# Patient Record
Sex: MALE | Race: WHITE | HISPANIC OR LATINO | Employment: FULL TIME | URBAN - METROPOLITAN AREA
[De-identification: names, ages, dates, MRNs, and addresses within clinical notes are randomized per-mention and may not be internally consistent; named-entity substitution may affect disease eponyms.]

---

## 2023-02-21 ENCOUNTER — HOSPITAL ENCOUNTER (EMERGENCY)
Facility: CLINIC | Age: 38
Discharge: HOME | End: 2023-02-21
Attending: EMERGENCY MEDICINE | Admitting: EMERGENCY MEDICINE

## 2023-02-21 VITALS
OXYGEN SATURATION: 98 % | DIASTOLIC BLOOD PRESSURE: 76 MMHG | HEART RATE: 62 BPM | TEMPERATURE: 98.5 F | RESPIRATION RATE: 20 BRPM | SYSTOLIC BLOOD PRESSURE: 117 MMHG

## 2023-02-21 DIAGNOSIS — S05.92XA BILATERAL EYE INJURIES, INITIAL ENCOUNTER: ICD-10-CM

## 2023-02-21 DIAGNOSIS — S05.91XA BILATERAL EYE INJURIES, INITIAL ENCOUNTER: ICD-10-CM

## 2023-02-21 DIAGNOSIS — S05.00XA CORNEAL ABRASION, UNSPECIFIED LATERALITY, INITIAL ENCOUNTER: ICD-10-CM

## 2023-02-21 PROCEDURE — 250N000009 HC RX 250: Performed by: EMERGENCY MEDICINE

## 2023-02-21 PROCEDURE — 99284 EMERGENCY DEPT VISIT MOD MDM: CPT

## 2023-02-21 RX ORDER — TETRACAINE HYDROCHLORIDE 5 MG/ML
2 SOLUTION OPHTHALMIC ONCE
Status: COMPLETED | OUTPATIENT
Start: 2023-02-21 | End: 2023-02-21

## 2023-02-21 RX ORDER — CYCLOPENTOLATE HYDROCHLORIDE 10 MG/ML
1 SOLUTION/ DROPS OPHTHALMIC 2 TIMES DAILY
Qty: 1 ML | Refills: 0 | Status: SHIPPED | OUTPATIENT
Start: 2023-02-21 | End: 2023-02-24

## 2023-02-21 RX ORDER — OFLOXACIN 3 MG/ML
2 SOLUTION/ DROPS OPHTHALMIC 4 TIMES DAILY
Qty: 5 ML | Refills: 0 | Status: SHIPPED | OUTPATIENT
Start: 2023-02-21 | End: 2023-02-28

## 2023-02-21 RX ADMIN — TETRACAINE HYDROCHLORIDE 2 DROP: 5 SOLUTION OPHTHALMIC at 13:00

## 2023-02-21 RX ADMIN — FLUORESCEIN SODIUM 1 STRIP: 1 STRIP OPHTHALMIC at 13:00

## 2023-02-21 ASSESSMENT — ENCOUNTER SYMPTOMS
EYE REDNESS: 1
EYE DISCHARGE: 1
EYE PAIN: 1

## 2023-02-21 ASSESSMENT — ACTIVITIES OF DAILY LIVING (ADL): ADLS_ACUITY_SCORE: 35

## 2023-02-21 NOTE — ED PROVIDER NOTES
EMERGENCY DEPARTMENT ENCOUNTER      NAME: Steve Castillo  AGE: 37 year old male  YOB: 1985  MRN: [id number]  EVALUATION DATE & TIME: 2/21/2023 12:00 PM    PCP: No primary care provider on file.    ED PROVIDER: Bam Miramontes M.D.      Chief Complaint   Patient presents with     Eye Injury         IMPRESSION  1. Bilateral eye injuries, initial encounter    2. Corneal abrasion, unspecified laterality, initial encounter        PLAN  - cyclopentolate & ofloxacin eye drops for home  - PO NSAIDs for additional pain  - close eye clinic follow up  - discharge to home    ED COURSE & MEDICAL DECISION MAKING    ED Course as of 02/21/23 1442   Tue Feb 21, 2023   1313 37yoM with no significant past medical history, no contacts or glasses use presenting with bilateral corneal abrasions. 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 Was looking straight at it when it unclogged and small ice pieces shot into his face & eyes. Resulting bilateral eye pain & tearing since then. Came straight to the ED. Was not wearing glasses at the time.    Has small abrasions to face on exam as well as bilateral corneal abrasions on Wood's lamp exam. No concern for open globe, foreign body, traumatic iritis, traumatic glaucoma. Right eye has  2mm x 3mm abrasion to 2 o'clock position and includes visual axis; left eye with 2mm x 2mm abrasion to 9 o'clock position which does not include visual axis, 2 small pinpoint central corneal abrasions as well. Negative Vargas sign. Ok for outpatient management. Prescribed cycloplegics given large abrasions and pain along with prophylactic ofloxacin. Eye referral made and contact info given as well. Patient able to tolerate PO and walk without difficulty. Patient comfortable with discharge at this time. Return precautions and need for PCP & eye clinic follow up discussed and understood. No further questions at the time of discharge.        --------------------------------------------------------------------------------   --------------------------------------------------------------------------------     12:10 PM I met with the patient for the initial interview and physical examination. Discussed plan for treatment and workup in the ED.  12:50 PM Turner Lamp procedure. See procedures section of this note for detailed documentation of this procedure.  1:04 PM We discussed the plan for discharge and the patient is agreeable. Reviewed supportive cares, symptomatic treatment, outpatient follow up, and reasons to return to the Emergency Department. Patient to be discharged by ED RN.       This patient involved a high degree of complexity in medical decision making, as significant risks were present and assessed. Recent encounters & results in medical record reviewed by me.    Broad differential considered for this patient presenting, including but not limited to:  Corneal abrasion, open globe, traumatic iritis, traumatic glaucoma, foreign body, skin abrasion, skin laceration, caustic exposure    I wore the following PPE during this patient encounter:  N95 mask, face shield w/ eye protection, gloves    See additional MDM below if interested.      MEDICATIONS GIVEN IN THE EMERGENCY DEPARTMENT  Medications   tetracaine (PONTOCAINE) 0.5 % ophthalmic solution 2 drop (2 drops Both Eyes Given by Other Clinician 2/21/23 1300)   fluorescein (FUL-MARIELLE) ophthalmic strip 1 strip (1 strip Both Eyes Given by Other Clinician 2/21/23 1300)       NEW PRESCRIPTIONS STARTED AT TODAY'S ER VISIT  Discharge Medication List as of 2/21/2023  1:04 PM      START taking these medications    Details   cyclopentolate (CYCLOGYL) 1 % ophthalmic solution Place 1 drop into both eyes 2 times daily for 3 days, Disp-1 mL, R-0, Local Print      ofloxacin (OCUFLOX) 0.3 % ophthalmic solution Place 2 drops into both eyes 4 times daily for 7 days, Disp-5 mL, R-0, Local Print                  =================================================================      [facility]  Patient information was obtained from: Patient    Use of : N/A      Steve Castillo is a 37 year old male, history reviewed and nothing pertinent, who presents to this ED via private vehicle with escort for evaluation of eye injury.    The patient reports he was at work today trying to fix a broken air hose when he was blasted in the face with air and ice crystals. He had immediate onset of bilateral eye pain, facial pain, blurry vision, eye redness, and watery discharge. He does not wear glasses or contacts.    REVIEW OF SYSTEMS   Review of Systems   HENT:        Positive for facial pain   Eyes: Positive for pain, discharge (watery), redness and visual disturbance (blurry vision).   All other systems reviewed and are negative except as noted above in HPI.        --------------- MEDICAL HISTORY ---------------  PAST MEDICAL HISTORY:  Reviewed by me.  History reviewed. No pertinent past medical history.  There is no problem list on file for this patient.      PAST SURGICAL HISTORY:  Reviewed by me.  History reviewed. No pertinent surgical history.    CURRENT MEDICATIONS:    Reviewed by me.  No current facility-administered medications for this encounter.    Current Outpatient Medications:      cyclopentolate (CYCLOGYL) 1 % ophthalmic solution, Place 1 drop into both eyes 2 times daily for 3 days, Disp: 1 mL, Rfl: 0     ofloxacin (OCUFLOX) 0.3 % ophthalmic solution, Place 2 drops into both eyes 4 times daily for 7 days, Disp: 5 mL, Rfl: 0    ALLERGIES:  Reviewed by me.  Allergies   Allergen Reactions     Penicillins        FAMILY HISTORY:  Reviewed by me.  History reviewed. No pertinent family history.    SOCIAL HISTORY:   Reviewed by me.  Lives in [place], [place]. Works construction.     --------------- PHYSICAL EXAM ---------------  Nursing notes and vitals reviewed by me.  VITALS:  Vitals:    02/21/23 1159 02/21/23 1308    BP: 138/81 117/76   Pulse: 68 62   Resp: 20    Temp: 98.5  F (36.9  C)    TempSrc: Temporal    SpO2: 99% 98%       PHYSICAL EXAM:    General:  alert, interactive, no distress  Eyes:  PERRL @ 3mm with EOMI, painless EOMI, no proptosis  Right eye: fluorescein exam with 2mm x 3mm abrasion to 2 o'clock position and includes visual axis; negative Vargas sign, no foreign body to lids, no ciliary flush  Left eye: fluorescein exam with 2mm x 2mm abrasion to 9 o'clock position which does not include visual axis, 2 small pinpoint central corneal abrasions as well, negative Vargas sign, no foreign body to lids, no ciliary flush  HENT:  nose with no rhinorrhea, oropharynx clear, scattered tiny abrasions to face with no bleeding, laceration, or tenderness  Neck:  no meningismus  Cardiovascular:  HR 80s during exam, regular rhythm, no murmurs, brisk cap refill  Chest:  no chest wall tenderness  Pulmonary:  no stridor, normal phonation, normal work of breathing, clear lungs bilaterally  Abdomen:  soft, nondistended, nontender  :  no CVA tenderness  Back:  no midline spinal tenderness  Musculoskeletal:  no pretibial edema, no calf tenderness. Gross ROM intact to joints of extremities with no obvious deformities.  Skin:  warm, dry, no rash  Neuro:  awake, alert, answers questions appropriately, follows commands, moves all limbs  Psych:  calm, normal affect      --------------- RESULTS ---------------  PROCEDURES:   Procedures     PROCEDURE: Woods lamp Exam   INDICATIONS: Bilateral Eye Injury   PROCEDURE PROVIDER: Bam Miramontes MD   SITE: bilateral eye injury   CONSENT:  The risks, benefits and alternatives for this procedure were explained to the patient and verbally accepted.     MEDICATION: fluorescein stain and tetracaine   EXAM FINDINGS: Right eye: fluorescein exam with 2mm x 3mm abrasion to 2 o'clock position and includes visual axis; negative Vargas sign, no foreign body to lids, no ciliary flush  Left eye: fluorescein  exam with 2mm x 2mm abrasion to 9 o'clock position which does not include visual axis, 2 small pinpoint central corneal abrasions as well, negative Vargas sign, no foreign body to lids, no ciliary flush   COMPLICATIONS: Patient tolerated procedure well, without complication           --------------- ADDITIONAL MDM ---------------  History:  - Supplemental history from:       -- see above charting, if applicable: patient  - External Record(s) reviewed:       -- see above charting, if applicable    Workup:  - Chart documentation above includes differential considered and any EKGs or imaging independently interpreted by provider.  - In additional to work up documented, I considered the following work up:       -- see above charting, if applicable    External Consultation:  - Discussion of management with another provider:       -- see above charting, if applicable    Complicating Factors:  - Care impacted by chronic illness:       -- see above MDM, past medical history, & problem list  - Care affected by social determinants of health:       -- see above social history    Disposition Considerations:  - Discharge       -- I considered admission given that the patient came to the Emergency Department, but ultimately discharged the patient per decision making above       -- I prescribed prescription strength medication(s) as charted above       -- I recommended over-the-counter medication(s) as charted above & in discharge instructions         I, Donta Price, am serving as a scribe to document services personally performed by Dr. Bam Miramontes based on my observation and the provider's statements to me. I, Bam Miramontes MD attest that Donta Price is acting in a scribe capacity, has observed my performance of the services and has documented them in accordance with my direction.      Bam Miramontes MD  02/21/23  Emergency Medicine  [facility] EMERGENCY ROOM  [address]  [place] [address]  [phone]  Dept: [phone]       Bam Miramontes MD  02/21/23 1443

## 2023-02-21 NOTE — ED TRIAGE NOTES
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 States there was ice stuck on it.

## 2023-02-21 NOTE — DISCHARGE INSTRUCTIONS
Use the eye drops as prescribed. One help prevent infection (ofloxacin) and the other helps with pain (cyclopentolate).    As needed for pain control at home, take:  - over-the-counter ibuprofen 800mg by mouth every 8 hours (max dose 2400mg in 24 hours)  - over-the-counter acetaminophen 1g by mouth every 6 hours (max dose 4g in 24 hours)    Call eye clinic to arrange follow up with them in 1-2 days.    Follow up with your Primary Care provider in 2 days for a recheck.    Return to the Emergency Department for any vision loss, persistent vomiting, inability to walk, new or worsening symptoms, or any other concerns.